# Patient Record
Sex: MALE | URBAN - METROPOLITAN AREA
[De-identification: names, ages, dates, MRNs, and addresses within clinical notes are randomized per-mention and may not be internally consistent; named-entity substitution may affect disease eponyms.]

---

## 2021-10-18 ENCOUNTER — OFFICE VISIT (OUTPATIENT)
Dept: URBAN - METROPOLITAN AREA CLINIC 91 | Facility: CLINIC | Age: 61
End: 2021-10-18
Payer: OTHER GOVERNMENT

## 2021-10-18 DIAGNOSIS — E11.9 TYPE 2 DIABETES MELLITUS WITHOUT COMPLICATIONS: Primary | ICD-10-CM

## 2021-10-18 DIAGNOSIS — H25.043 POSTERIOR SUBCAPSULAR POLAR AGE-RELATED CATARACT, BILATERAL: ICD-10-CM

## 2021-10-18 PROCEDURE — 99204 OFFICE O/P NEW MOD 45 MIN: CPT | Performed by: SPECIALIST

## 2021-10-18 ASSESSMENT — VISUAL ACUITY
OD: 20/20
OS: 20/20

## 2021-10-18 ASSESSMENT — INTRAOCULAR PRESSURE
OS: 16
OD: 16

## 2021-10-18 NOTE — IMPRESSION/PLAN
Impression: Type 2 diabetes mellitus without complications: I76.5. Plan: DM without signs of diabetic retinopathy. Diabetic eye disease and importance of regular eye exams and tight glucose control discussed. Patient to monitor vision at home and call with any worsening changes.

## 2021-10-18 NOTE — IMPRESSION/PLAN
Impression: Posterior subcapsular polar age-related cataract, bilateral: H25.043. Plan: Due to the fact that cataract is not affecting patient's vision in, I would not recommend surgical intervention at this time. Patient was advised to consider using UVB sunglasses when outdoors. We will consider cataract surgery at later time if patient's visual function no longer meets their needs or interferes with their daily activities.